# Patient Record
Sex: MALE | URBAN - METROPOLITAN AREA
[De-identification: names, ages, dates, MRNs, and addresses within clinical notes are randomized per-mention and may not be internally consistent; named-entity substitution may affect disease eponyms.]

---

## 2020-07-25 ENCOUNTER — TELEPHONE ENCOUNTER (OUTPATIENT)
Dept: URBAN - METROPOLITAN AREA CLINIC 13 | Facility: CLINIC | Age: 60
End: 2020-07-25

## 2020-07-26 ENCOUNTER — TELEPHONE ENCOUNTER (OUTPATIENT)
Dept: URBAN - METROPOLITAN AREA CLINIC 13 | Facility: CLINIC | Age: 60
End: 2020-07-26

## 2025-07-07 ENCOUNTER — LAB OUTSIDE AN ENCOUNTER (OUTPATIENT)
Dept: URBAN - METROPOLITAN AREA CLINIC 107 | Facility: CLINIC | Age: 65
End: 2025-07-07

## 2025-07-07 ENCOUNTER — OFFICE VISIT (OUTPATIENT)
Dept: URBAN - METROPOLITAN AREA CLINIC 107 | Facility: CLINIC | Age: 65
End: 2025-07-07
Payer: OTHER GOVERNMENT

## 2025-07-07 ENCOUNTER — DASHBOARD ENCOUNTERS (OUTPATIENT)
Age: 65
End: 2025-07-07

## 2025-07-07 DIAGNOSIS — R13.19 ESOPHAGEAL DYSPHAGIA: ICD-10-CM

## 2025-07-07 DIAGNOSIS — Z95.5 HISTORY OF HEART ARTERY STENT: ICD-10-CM

## 2025-07-07 DIAGNOSIS — Z12.11 COLON CANCER SCREENING: ICD-10-CM

## 2025-07-07 PROBLEM — 428375006: Status: ACTIVE | Noted: 2025-07-07

## 2025-07-07 PROCEDURE — 99204 OFFICE O/P NEW MOD 45 MIN: CPT | Performed by: NURSE PRACTITIONER

## 2025-07-07 RX ORDER — METHOTREXATE 2.5 MG/1
TAKE 10 TABLETS BY MOUTH ONCE A WEEK TABLET ORAL
Qty: 40 EACH | Refills: 1 | Status: ACTIVE | COMMUNITY

## 2025-07-07 RX ORDER — ATORVASTATIN CALCIUM 80 MG/1
1/2 TABLET TABLET, FILM COATED ORAL ONCE A DAY
Status: ACTIVE | COMMUNITY

## 2025-07-07 RX ORDER — FOLIC ACID 1 MG/1
TAKE 1 TABLET BY MOUTH EVERY DAY TABLET ORAL
Qty: 30 EACH | Refills: 5 | Status: ACTIVE | COMMUNITY

## 2025-07-07 NOTE — HPI-TODAY'S VISIT:
65-year-old gentleman presenting to discuss a colonoscopy in the setting of a history of cardiac stents.  He was referred by [facility] Selin [facility].  A copy of today's visit will be forwarded to the referring provider.  He tells me that his last colonoscopy was more than 10 years ago. No personal history of colon polyps or family history of colon cancer. There is no family history of liver disease, pancreas disease, stomach or esophagus cancer.   He has a history of cardiac stent placement (x2) placed in 2016. This was performed at [facility]. He is no longer on any antiplatelet therapy. He has not had any issues since this time, and was cleared by cardiology sometime in 2017. There is no need for supplemental O2. He denies any history of kidney disease, need for kidney dialysis, or history of seizures.  He denies any abdominal pain, nausea or vomiting. No heartburn. On rare occasion, he has a sensation of food wanting to stick mid chest. This has not occurred in about 3 months. There is no weight loss. No melena or blood per rectum. He has bowel movements twice daily.

## 2025-07-08 ENCOUNTER — OFFICE VISIT (OUTPATIENT)
Dept: URBAN - METROPOLITAN AREA CLINIC 107 | Facility: CLINIC | Age: 65
End: 2025-07-08

## 2025-08-13 ENCOUNTER — CLAIMS CREATED FROM THE CLAIM WINDOW (OUTPATIENT)
Dept: URBAN - METROPOLITAN AREA CLINIC 4 | Facility: CLINIC | Age: 65
End: 2025-08-13
Payer: OTHER GOVERNMENT

## 2025-08-13 ENCOUNTER — OFFICE VISIT (OUTPATIENT)
Dept: URBAN - METROPOLITAN AREA SURGERY CENTER 25 | Facility: SURGERY CENTER | Age: 65
End: 2025-08-13
Payer: OTHER GOVERNMENT

## 2025-08-13 DIAGNOSIS — Z12.11 COLON CANCER SCREENING: ICD-10-CM

## 2025-08-13 DIAGNOSIS — D12.0 ADENOMA OF CECUM: ICD-10-CM

## 2025-08-13 DIAGNOSIS — D12.0 BENIGN NEOPLASM OF CECUM: ICD-10-CM

## 2025-08-13 DIAGNOSIS — K63.5 BENIGN COLON POLYP: ICD-10-CM

## 2025-08-13 PROCEDURE — 45385 COLONOSCOPY W/LESION REMOVAL: CPT | Performed by: INTERNAL MEDICINE

## 2025-08-13 PROCEDURE — 00811 ANES LWR INTST NDSC NOS: CPT

## 2025-08-13 PROCEDURE — 88305 TISSUE EXAM BY PATHOLOGIST: CPT | Performed by: PATHOLOGY

## 2025-08-13 RX ORDER — ATORVASTATIN CALCIUM 80 MG/1
1/2 TABLET TABLET, FILM COATED ORAL ONCE A DAY
Status: ACTIVE | COMMUNITY

## 2025-08-13 RX ORDER — FOLIC ACID 1 MG/1
TAKE 1 TABLET BY MOUTH EVERY DAY TABLET ORAL
Qty: 30 EACH | Refills: 5 | Status: ACTIVE | COMMUNITY

## 2025-08-13 RX ORDER — METHOTREXATE 2.5 MG/1
TAKE 10 TABLETS BY MOUTH ONCE A WEEK TABLET ORAL
Qty: 40 EACH | Refills: 1 | Status: ACTIVE | COMMUNITY

## 2025-08-25 ENCOUNTER — OFFICE VISIT (OUTPATIENT)
Dept: URBAN - METROPOLITAN AREA CLINIC 107 | Facility: CLINIC | Age: 65
End: 2025-08-25